# Patient Record
Sex: FEMALE | Race: WHITE | Employment: UNEMPLOYED | URBAN - METROPOLITAN AREA
[De-identification: names, ages, dates, MRNs, and addresses within clinical notes are randomized per-mention and may not be internally consistent; named-entity substitution may affect disease eponyms.]

---

## 2023-01-05 RX ORDER — DEXTROMETHORPHAN/PSEUDOEPHED 2.5-7.5/.8
1.2 DROPS ORAL
Status: CANCELLED | OUTPATIENT
Start: 2023-01-05

## 2023-01-05 RX ORDER — SODIUM CHLORIDE 0.9 % (FLUSH) 0.9 %
5-40 SYRINGE (ML) INJECTION AS NEEDED
Status: CANCELLED | OUTPATIENT
Start: 2023-01-05

## 2023-01-05 RX ORDER — ATROPINE SULFATE 0.1 MG/ML
0.5 INJECTION INTRAVENOUS
Status: CANCELLED | OUTPATIENT
Start: 2023-01-05 | End: 2023-01-06

## 2023-01-05 RX ORDER — SODIUM CHLORIDE 0.9 % (FLUSH) 0.9 %
5-40 SYRINGE (ML) INJECTION EVERY 8 HOURS
Status: CANCELLED | OUTPATIENT
Start: 2023-01-05

## 2023-01-05 RX ORDER — EPINEPHRINE 0.1 MG/ML
1 INJECTION INTRACARDIAC; INTRAVENOUS
Status: CANCELLED | OUTPATIENT
Start: 2023-01-05 | End: 2023-01-06

## 2023-01-05 RX ORDER — DIPHENHYDRAMINE HYDROCHLORIDE 50 MG/ML
50 INJECTION, SOLUTION INTRAMUSCULAR; INTRAVENOUS ONCE
Status: CANCELLED | OUTPATIENT
Start: 2023-01-05 | End: 2023-01-05

## 2023-01-11 ENCOUNTER — HOSPITAL ENCOUNTER (OUTPATIENT)
Age: 53
Setting detail: OUTPATIENT SURGERY
Discharge: HOME | End: 2023-01-11
Attending: INTERNAL MEDICINE | Admitting: INTERNAL MEDICINE
Payer: COMMERCIAL

## 2023-01-11 VITALS
WEIGHT: 193.5 LBS | HEIGHT: 66 IN | SYSTOLIC BLOOD PRESSURE: 113 MMHG | OXYGEN SATURATION: 100 % | TEMPERATURE: 98.6 F | RESPIRATION RATE: 18 BRPM | BODY MASS INDEX: 31.1 KG/M2 | DIASTOLIC BLOOD PRESSURE: 76 MMHG | HEART RATE: 64 BPM

## 2023-01-11 LAB — HCG UR QL: NEGATIVE

## 2023-01-11 PROCEDURE — 77030013991 HC SNR POLYP ENDOSC BSC -A: Performed by: INTERNAL MEDICINE

## 2023-01-11 PROCEDURE — 77030020268 HC MISC GENERAL SUPPLY: Performed by: INTERNAL MEDICINE

## 2023-01-11 PROCEDURE — 74011000250 HC RX REV CODE- 250: Performed by: INTERNAL MEDICINE

## 2023-01-11 PROCEDURE — 2709999900 HC NON-CHARGEABLE SUPPLY: Performed by: INTERNAL MEDICINE

## 2023-01-11 PROCEDURE — 77030029929: Performed by: INTERNAL MEDICINE

## 2023-01-11 PROCEDURE — 88305 TISSUE EXAM BY PATHOLOGIST: CPT

## 2023-01-11 PROCEDURE — 74011250636 HC RX REV CODE- 250/636: Performed by: INTERNAL MEDICINE

## 2023-01-11 PROCEDURE — 81025 URINE PREGNANCY TEST: CPT

## 2023-01-11 PROCEDURE — 99153 MOD SED SAME PHYS/QHP EA: CPT | Performed by: INTERNAL MEDICINE

## 2023-01-11 PROCEDURE — 76040000008: Performed by: INTERNAL MEDICINE

## 2023-01-11 PROCEDURE — G0500 MOD SEDAT ENDO SERVICE >5YRS: HCPCS | Performed by: INTERNAL MEDICINE

## 2023-01-11 RX ORDER — FLUMAZENIL 0.1 MG/ML
0.2 INJECTION INTRAVENOUS
Status: DISCONTINUED | OUTPATIENT
Start: 2023-01-11 | End: 2023-01-11 | Stop reason: HOSPADM

## 2023-01-11 RX ORDER — GLYCOPYRROLATE 0.2 MG/ML
INJECTION INTRAMUSCULAR; INTRAVENOUS AS NEEDED
Status: DISCONTINUED | OUTPATIENT
Start: 2023-01-11 | End: 2023-01-11 | Stop reason: HOSPADM

## 2023-01-11 RX ORDER — MIDAZOLAM HYDROCHLORIDE 1 MG/ML
.25-5 INJECTION, SOLUTION INTRAMUSCULAR; INTRAVENOUS
Status: DISCONTINUED | OUTPATIENT
Start: 2023-01-11 | End: 2023-01-11 | Stop reason: HOSPADM

## 2023-01-11 RX ORDER — NALOXONE HYDROCHLORIDE 0.4 MG/ML
0.4 INJECTION, SOLUTION INTRAMUSCULAR; INTRAVENOUS; SUBCUTANEOUS
Status: DISCONTINUED | OUTPATIENT
Start: 2023-01-11 | End: 2023-01-11 | Stop reason: HOSPADM

## 2023-01-11 RX ORDER — FENTANYL CITRATE 50 UG/ML
100 INJECTION, SOLUTION INTRAMUSCULAR; INTRAVENOUS
Status: DISCONTINUED | OUTPATIENT
Start: 2023-01-11 | End: 2023-01-11 | Stop reason: HOSPADM

## 2023-01-11 RX ORDER — SODIUM CHLORIDE 9 MG/ML
1000 INJECTION, SOLUTION INTRAVENOUS CONTINUOUS
Status: DISCONTINUED | OUTPATIENT
Start: 2023-01-11 | End: 2023-01-11 | Stop reason: HOSPADM

## 2023-01-11 RX ADMIN — SODIUM CHLORIDE 1000 ML: 9 INJECTION, SOLUTION INTRAVENOUS at 12:02

## 2023-01-11 NOTE — DISCHARGE INSTRUCTIONS
[facility]  [id number]  1970    COLON DISCHARGE INSTRUCTIONS    Discomfort:  Redness at IV site- apply warm compress to area; if redness or soreness persist- contact your physician  There may be a slight amount of blood passed from the rectum  Gaseous discomfort- walking, belching will help relieve any discomfort  You may not operate a vehicle til the next day. You may not engage in an occupation involving machinery or appliances til the next day. You may not drink alcoholic beverages til the next day. DIET:   High fiber diet. ACTIVITY:  You may not  resume your normal daily activities til the next day. it is recommended that you spend the remainder of the day resting -  avoid any strenuous activity. CALL M.D.  IF ANY SIGN OF:   Increasing pain, nausea, vomiting  Abdominal distension (swelling)  New increased bleeding (oral or rectal)  Fever (chills)  Pain in chest area  Bloody discharge from nose or mouth  Shortness of breath    You may not  take any Advil, Aspirin, Ibuprofen, Motrin, Aleve, or Goodys for 21 days, ONLY  Tylenol as needed for pain. Post procedure diagnosis:  TORTUOUS COLON; POLYPS    Follow-up Instructions: Your follow up colonoscopy will be in 3 years. We will notify you the results of your biopsy by letter within 2 weeks. Albert Clements MD  January 11, 2023       DISCHARGE SUMMARY from Nurse    PATIENT INSTRUCTIONS:    After general anesthesia or intravenous sedation, for 24 hours or while taking prescription Narcotics:  Limit your activities  Do not drive and operate hazardous machinery  Do not make important personal or business decisions  Do  not drink alcoholic beverages  If you have not urinated within 8 hours after discharge, please contact your surgeon on call.     Report the following to your surgeon:  Excessive pain, swelling, redness or odor of or around the surgical area  Temperature over 100.5  Nausea and vomiting lasting longer than 4 hours or if unable to take medications  Any signs of decreased circulation or nerve impairment to extremity: change in color, persistent  numbness, tingling, coldness or increase pain  Any questions    What to do at Home:  Recommended activity: Activity as tolerated and no driving for today. If you experience any of the following symptoms as above, please follow up with Dr. Arlen Delvalle [phone]. *  Please give a list of your current medications to your Primary Care Provider. *  Please update this list whenever your medications are discontinued, doses are      changed, or new medications (including over-the-counter products) are added. *  Please carry medication information at all times in case of emergency situations. These are general instructions for a healthy lifestyle:    No smoking/ No tobacco products/ Avoid exposure to second hand smoke  Surgeon General's Warning:  Quitting smoking now greatly reduces serious risk to your health. Obesity, smoking, and sedentary lifestyle greatly increases your risk for illness    A healthy diet, regular physical exercise & weight monitoring are important for maintaining a healthy lifestyle    You may be retaining fluid if you have a history of heart failure or if you experience any of the following symptoms:  Weight gain of 3 pounds or more overnight or 5 pounds in a week, increased swelling in our hands or feet or shortness of breath while lying flat in bed. Please call your doctor as soon as you notice any of these symptoms; do not wait until your next office visit. The discharge information has been reviewed with the patient and spouse. The patient and spouse verbalized understanding. Discharge medications reviewed with the patient and spouse and appropriate educational materials and side effects teaching were provided.     Patient armband removed and shredded.   ___________________________________________________________________________________________________________________________________

## 2023-01-11 NOTE — H&P
Assessment/Plan  # Detail Type Description    1. Assessment Encounter for screening for malignant neoplasm of colon (Z12.11). Impression 51 yo female a Pt of Dr. Hermon Meigs, here for her first screening colonoscopy. No FHx of colon cancer. Son - Leukemia. Asymptomatic of GI complaints. BMI: 30.34 (Short Frame), BM: Irregular. *PM/SH: M2K4 (: 4), s/p Cholecystectomy, s/p Tonsillectomy, Hx of abnormal Pap smear - s/p Colposcopy w/Bx. No reported hx of additional abdominal surgeries, strokes, or CAD. Patient Plan -Counseled patient regarding alternate forms of (CRC) colorectal cancer screening, and answered all of her questions. I explained, these screening options are not a replacement for Colonoscopy, as this is the only way of preventing CRC through the removal of polyps via polypectomy. Pt verbalized understanding and agreed to proceed with c-scope at this time. *C-scope Plan:  First colonoscopy ordered with Dr. Jonathan Ding with Miralax bowel prep, and Mag Citrate 2 days before prep, and Miralax and stool softeners starting 3 days before prep.  -Patient is advised that they should not take their Diclofenac the day before or the day of the procedure. *C-scope Risks:  Stressed importance of following all bowel preparation instructions. Explained the procedure to the patient including all risks and benefits. These risks consist of missed lesions on exam, bleeding, and bowel perforation with possible need for admission to the hospital, and in the most extensive of  circumstances, the patient may require surgery. Pt verbalized understanding of these risks and is agreeable with this procedure    Plan Orders Further diagnostic evaluations ordered today include(s) DIAGNOSTIC COLONOSCOPY to be performed. This 50year old  patient was referred by Raymond Sanchez. This 50year old female presents for Colon Cancer Screening. History of Present Illness  1.   Colon Cancer Screening   No prior screening. Denies risk factors. Pertinent negatives include abdominal pain, change in bowel habits, change in stool caliber, constipation, decreased appetite, diarrhea, melena, nausea, rectal bleeding, vomiting, weight gain and weight loss. Additional information: No family history of colon cancer and BM: Irregular. Problem List  Problem Description Onset Date Chronic Clinical Status Notes   GERD 2014 N       Past Medical/Surgical History   (Detailed)  Disease/disorder Onset Date Management Date Comments   Pregnancy  9 hr labor  1999    Pregnancy  8 hr labor  1996    Pregnancy  6 hr labor  1994    Pregnancy  4 hr labor  10/1989    History of Abnormal Pap  Colposcopy with biopsy       tonsillectomy       Cholecystectomy     Pregnancy-full term  Full term     Pregnancy-full term  Full term     Pregnancy-full term  Full term     Pregnancy-full term  Full term         Gynecologic History  Patient is premenopausal.       Family History   (Detailed)    Relationship Family Member Name  Age at Death Condition Onset Age Cause of Death   Maternal grandmother  N  Hypertension  N   Maternal grandmother  N  Diabetes mellitus  N   Maternal grandmother  N  Coronary artery disease  N   Maternal grandmother    Diabetes mellitus type 2  N   Mother    High Cholesterol  N   Mother  N  Hypertension  N   Son  N  Leukemia  N     Social History  (Detailed)  Tobacco use reviewed. The patient is right-handed. Preferred language is MEETiiN. Education/Employment/Occupation  Employment History Status Retired Restrictions    Teacher        Marital Status/Family/Social Support  Marital status:      Tobacco use status: Cigarette smoker. Smoking status: Never smoker. Tobacco Screening  Patient has used tobacco. Patient has not used tobacco in the last 30 days.      Smoking Status  Type Smoking Status Usage Per Day Years Used Pack Years Total Pack Years   Cigarette Former smoker         Alcohol  There is a history of alcohol use. consumed occasionally. Caffeine  The patient uses caffeine: coffee and soda. - 1 cup a day. Lifestyle  Exercises 2-3 times a week. Medications (active prior to today)  Medication Instructions Start Date Stop Date Refilled Elsewhere   Liletta 20.1 mcg/24 hrs (6 yrs) 52 mg intrauterine device  01/20/2022   N   famotidine 40 mg tablet take 1 tablet by oral route  every day at bedtime 01/25/2022   N   DICLOFENAC SODIUM 75MG DR TABLETS TAKE 1 TABLET BY MOUTH TWICE DAILY WITH FOOD FOR FINGER PAIN 02/11/2022 02/11/2022 N     Patient Status   Completed with information received for patient in a summary of care record. Medication Reconciliation  Medications reconciled today. Medication Reviewed  Adherence Medication Name Sig Desc Elsewhere Status   taking as directed Liletta 20.1 mcg/24 hrs (6 yrs) 52 mg intrauterine device  N Verified   taking as directed DICLOFENAC SODIUM 75MG DR TABLETS TAKE 1 TABLET BY MOUTH TWICE DAILY WITH FOOD FOR FINGER PAIN N Verified   taking as directed famotidine 40 mg tablet take 1 tablet by oral route  every day at bedtime N Verified     Medications (Added, Continued or Stopped today)  Start Date Medication Directions PRN Status PRN Reason Instruction Stop Date   02/11/2022 DICLOFENAC SODIUM 75MG DR TABLETS TAKE 1 TABLET BY MOUTH TWICE DAILY WITH FOOD FOR FINGER PAIN N      01/25/2022 famotidine 40 mg tablet take 1 tablet by oral route  every day at bedtime N      01/20/2022 Liletta 20.1 mcg/24 hrs (6 yrs) 52 mg intrauterine device  N        Allergies  Ingredient Reaction (Severity) Medication Name Comment   AMOXICILLIN itchy rash (mild)     CODEINE        Reviewed, no changes. Review of Systems  System Neg/Pos Details   Constitutional Negative Chills, Fever, Malaise, Weight gain and Weight loss. ENMT Negative Ear infections, Nasal congestion, Sinus Infection and Sore throat.    Eyes Negative Double vision and Eye pain. Respiratory Negative Asthma, Chronic cough, Dyspnea, Pleuritic pain and Wheezing. Cardio Negative Chest pain, Edema and Irregular heartbeat/palpitations. GI Negative Abdominal pain, Change in bowel habits, Change in stool caliber, Constipation, Decreased appetite, Diarrhea, Dysphagia, Heartburn, Hematemesis, Hematochezia, Melena, Nausea, Rectal bleeding, Reflux and Vomiting.  Negative Dysuria, Hematuria, Urinary frequency, Urinary incontinence and Urinary retention. Endocrine Negative Cold intolerance, Heat intolerance and Increased thirst.   Neuro Negative Dizziness, Headache, Numbness, Tremors and Vertigo. Psych Negative Anxiety, Depression and Increased stress. Integumentary Negative Hives, Pruritus and Rash. MS Negative Back pain, Joint pain and Myalgia. Hema/Lymph Negative Easy bleeding, Easy bruising and Lymphadenopathy. Allergic/Immuno Negative Food allergies and Immunosuppression. Reproductive Positive The patient is pre-menopausal.   Reproductive Negative Breast lumps, Breast pain and Vaginal discharge. Vital Signs   Gynecologic History  Patient is premenopausal.      Height  Time ft in cm Last Measured Height Position   10:34 AM 5.0 6.00 167.64 08/18/2020 Standing     Weight/BSA/BMI  Time lb oz kg Context BMI kg/m2 BSA m2   10:34 .00  85.275 dressed with shoes 30.34      Date/Time Temp Pulse BP Cardiac Rhythm [facility]   01/11/23 1113 98.5 °F (36.9 °C) 68 128/80 -- CS     Respiratory Therapy (last day)    Date/Time Resp SpO2 O2 Device O2 Flow Rate (L/min) [facility]   01/11/23 1113 20 98 % None (Room air) -- CS     Physical  Exam  Exam Findings Details   Female GI Quick Visit Comments Short Frame   Constitutional Normal Well developed.    Eyes Normal Conjunctiva - Right: Normal, Left: Normal. Sclera - Right: Normal, Left: Normal.   Nasopharynx Normal Lips/teeth/gums - Normal.   Neck Exam Normal Inspection - Normal.   Respiratory Normal Inspection - Normal. Cardiovascular Normal Regular rate and rhythm. No murmurs, gallops, or rubs. Vascular Normal Pulses - Brachial: Normal.   Skin Normal Inspection - Normal.   Musculoskeletal Normal Hands/Wrist - Right: Normal, Left: Normal.   Extremity Normal No edema. Neurological Normal Fine motor skills - Normal.   Psychiatric Normal Orientation - Oriented to time, place, person & situation. Appropriate mood and affect.    Immunizations Entered by History  Date Immunization   11/1/2018 12:00:00 AM Flu (3 yrs or older)       Active Patient Care Team Members  Name Contact Agency Type Support Role Relationship Active Date Inactive Date Specialty   Laurie Oakley   Emergency Contact Child, Mother is the Patient      Jerry Madison   Patient provider PCP   [address]   encounter provider    Gastroenterology     Patient questioned and examined

## 2023-01-11 NOTE — PROCEDURES
[facility]  Colonoscopy Procedure Report  _______________________________________________________  Patient: Luz Marina Abraham                                        Attending Physician: Jeaneth Leavitt MD    Patient ID: [id number]                                    Referring Physician: No primary care provider on file. Exam Date: 2023      Introduction: A  50 y.o. female patient, presents for inpatient Colonoscopy    Indications: Screening for colon cancer average risk and asymptomatic. 49 yo female a Pt of Dr. Rima Bauman, here for her first screening colonoscopy. No FHx of colon cancer. Son - Leukemia. Asymptomatic of GI complaints. BMI: 30.34 (Short Frame), BM: Irregular every 2 to 3 days and sometimes daily. *PM/SH: N8Z6 (: 4), s/p Cholecystectomy, s/p Tonsillectomy, Hx of abnormal Pap smear - s/p Colposcopy w/Bx. Consent: The benefits, risks, and alternatives to the procedure were discussed and informed consent was obtained from the patient. Preparation: EKG, pulse, pulse oximetry and blood pressure were monitored throughout the procedure. ASA Classification: Class I- . The heart is an S1-S2 and regular heart rate and rhythm. Lungs are clear to auscultation and percussion. Abdomen is soft, nondistended, and nontender. Mental Status: awake, alert, and oriented to person, place, and time    Medications:  Fentanyl 100 mcg IV before procedure. Versed 5 mg IV, Rubinol . 4 mg IV throughout the procedure. Rectal Exam: Normal Rectal Exam. No Blood. Pathology Specimens:  3    Procedure: The colonoscope was passed with difficulty through the anus under direct visualization and advanced to the cecum and 5 cm inside the terminal ileum. Retroflexion is made in the ascending colon. The patient required positioning on the back to aid in the passage of the scope. The scope was withdrawn and the mucosa was carefully examined. The quality of the preparation was very good.  The views were excellent. The patient's toleration of the procedure was excellent. The exam was done twice to the cecum. Total time is 44 minutes and withdrawal time is 34 minutes. Findings:    Rectum:   Small internal hemorrhoids. Sigmoid:   Long redundant and tortuous sigmoid with mild sigmoid diverticulosis. Descending Colon:   Normal   Transverse Colon:   One diverticulum in the proximal transverse colon. 2 small 3 and 4 mm sessile polyps in  the transverse colon, cold snared  Ascending Colon:   4 mm sessile polyp in the ascending colon, cold snared  Cecum:   14 mm sessile polyp in the cecum, hot snared in one block after 7 cc saline injection, bleeding occurred after controlled with application of 2 metallic clips  Terminal Ileum:   Normal.       Unplanned Events: There were no unplanned events. Estimated Blood Loss: 3 cc. IMPLANTS: two temporary metallic clips applied in the cecum. Impressions: Small internal hemorrhoids. Long redundant and tortuous sigmoid with mild sigmoid diverticulosis. One diverticulum in the proximal transverse colon. 2 small 3 and 4 mm sessile polyps in  the transverse colon, cold snared. 4 mm sessile polyp in the ascending colon, cold snared. 14 mm sessile polyp in the cecum, hot snared in one block after 7 cc saline injection, bleeding occurred after controlled with application of 2 metallic clips Normal Mucosa. No blood or AVM found. Complications: None; patient tolerated the procedure well. Recommendations:  Discharge home when standard parameters are met. Resume a high fiber diet. Resume own medications. Avoid all NSAID's for 21 days. Colonoscopy recommendation in 3 years.   Take Miralax and/ or Colace 100 mg on regular basis to avoid being constipated    Procedure Codes:    Ajay Chappell [[id number]]  COLONOSCPY,FLEX,W/ [facility] [[id number]]    Endoscope Information:  Model Number(s)    [id number]   Assistant: None  Signed By: Sadaf Rueda MD Date: 1/11/2023

## (undated) DEVICE — WRISTBAND ID AD W2.5XL9.5CM RED VYN ADH CLSR UNI-PRINT

## (undated) DEVICE — SET ADMIN 16ML TBNG L100IN 2 Y INJ SITE IV PIGGY BK DISP (ORDER IN MULIPLES OF 48)

## (undated) DEVICE — SINGLE PORT MANIFOLD: Brand: NEPTUNE 2

## (undated) DEVICE — NDL FLTR TIP 5 MIC 18GX1.5IN --

## (undated) DEVICE — ERBE NESSY®PLATE 170 SPLIT; 168CM²; CABLE 3M: Brand: ERBE

## (undated) DEVICE — TRAP SPEC POLYP REM STRNR CLN DSGN MAGNIFYING WIND DISP

## (undated) DEVICE — NEEDLE SYR 18GA L1.5IN RED PLAS HUB S STL BLNT FILL W/O

## (undated) DEVICE — SPONGE GZ W4XL4IN RAYON POLY 4 PLY NONWOVEN FASTER WICKING

## (undated) DEVICE — CANNULA CUSH AD W/ 14FT TBG

## (undated) DEVICE — Device

## (undated) DEVICE — SYR 5ML 1/5 GRAD LL NSAF LF --

## (undated) DEVICE — SOLUTION IV 500ML 0.9% SOD CHL FLX CONT

## (undated) DEVICE — SNARE POLYP SM W13MMXL240CM SHTH DIA2.4MM OVL FLX DISP

## (undated) DEVICE — CATHETER IV 22GA L1IN BLU POLYUR STR HUB RADPQ PROTCT +

## (undated) DEVICE — SYRINGE 50ML E/T

## (undated) DEVICE — CATHETER PH SUCT 14FR

## (undated) DEVICE — TUBING, SUCTION, 1/4" X 12', STRAIGHT: Brand: MEDLINE

## (undated) DEVICE — Z INACTIVE USE 2854097 SPONGE GZ W4XL4IN COT 12 PLY TYP VII WVN C FLD DSGN

## (undated) DEVICE — KENDALL RADIOLUCENT FOAM MONITORING ELECTRODE RECTANGULAR SHAPE: Brand: KENDALL

## (undated) DEVICE — TOURNIQUET PHLEB W1XL18IN BLU FLAT RL AND BND REUSE FOR IV

## (undated) DEVICE — SYRINGE MED 3ML CLR PLAS STD N CTRL LUERLOCK TIP DISP

## (undated) DEVICE — NEEDLE INJ 25GA P5MM SHFT L230CM SHTH DIA2.5MM S STL TEF